# Patient Record
Sex: MALE
[De-identification: names, ages, dates, MRNs, and addresses within clinical notes are randomized per-mention and may not be internally consistent; named-entity substitution may affect disease eponyms.]

---

## 2019-03-03 ENCOUNTER — HOSPITAL ENCOUNTER (EMERGENCY)
Dept: HOSPITAL 14 - H.ER | Age: 34
Discharge: HOME | End: 2019-03-03
Payer: COMMERCIAL

## 2019-03-03 VITALS
TEMPERATURE: 97.6 F | OXYGEN SATURATION: 97 % | RESPIRATION RATE: 17 BRPM | HEART RATE: 100 BPM | DIASTOLIC BLOOD PRESSURE: 108 MMHG | SYSTOLIC BLOOD PRESSURE: 159 MMHG

## 2019-03-03 DIAGNOSIS — F10.10: Primary | ICD-10-CM

## 2019-03-03 NOTE — ED PDOC
HPI: Psych/Substance Abuse


Time Seen by Provider: 03/03/19 03:59


Chief Complaint (Nursing): Alcohol Ingestion


Chief Complaint (Provider): Alcohol Use


History/Exam Limitations: no limitations (Pt presents after being brought in by 

[facility] for alcohol intoxication. During examination, pt is speaking in lucid and 

clear terms, his gaze is normal and unlabored, his gait is strong and straight.)





Past Medical History


Reviewed: Historical Data, Nursing Documentation, Vital Signs


Vital Signs: 





                                Last Vital Signs











Temp  97.6 F   03/03/19 03:44


 


Pulse  100 H  03/03/19 03:44


 


Resp  17   03/03/19 03:44


 


BP  159/108 H  03/03/19 03:44


 


Pulse Ox  97   03/03/19 03:44














- Family History


Family History: States: Unknown Family Hx





- Allergies


Allergies/Adverse Reactions: 


                                    Allergies











Allergy/AdvReac Type Severity Reaction Status Date / Time


 


No Known Allergies Allergy   Verified 03/03/19 03:50














Review of Systems


ROS Statement: Except As Marked, All Systems Reviewed And Found Negative (Pt 

denies all ROS)





Physical Exam





- Reviewed


Nursing Documentation Reviewed: Yes


Vital Signs Reviewed: Yes





- Physical Exam


Appears: Positive for: Well, Non-toxic, No Acute Distress.  Negative for: 

Uncomfortable


Head Exam: Positive for: ATRAUMATIC, NORMAL INSPECTION


Skin: Positive for: Normal Color, Warm, Dry.  Negative for: Diaphoresis, Pallor,

Rash


Eye Exam: Positive for: Normal appearance, PERRL.  Negative for: Nystagmus, 

Periorbital swelling, Periorbital tenderness


Neck: Positive for: Normal, Painless ROM, Supple.  Negative for: Decreased ROM


Cardiovascular/Chest: Positive for: Regular Rate, Rhythm


Respiratory: Positive for: Normal Breath Sounds


Pulses-Carotid (L): 2+


Pulses-Carotid (R): 2+


Pulses-Radial (L): 2+


Pulses-Radial (R): 2+





- ECG


O2 Sat by Pulse Oximetry: 97





Medical Decision Making


Medical Decision Making: 





I: Alcohol Consumption


P: test for sobriety. On evaluation pt is speaking in lucid and clear terms, his

gaze is normal and unlabored, his gait is strong and straight.





Pt is requesting discharge


Pt is stable


Pt is safe for discharge pending acceptable vitls





Disposition





- Clinical Impression


Clinical Impression: 


 Alcohol use








- Patient ED Disposition


Is Patient to be Admitted: No


Counseled Patient/Family Regarding: Diagnosis





- Disposition


Disposition: Routine/Home


Disposition Time: 04:22


Condition: STABLE